# Patient Record
Sex: FEMALE | Race: AMERICAN INDIAN OR ALASKA NATIVE
[De-identification: names, ages, dates, MRNs, and addresses within clinical notes are randomized per-mention and may not be internally consistent; named-entity substitution may affect disease eponyms.]

---

## 2019-01-03 NOTE — C.PDOC
History Of Present Illness


53 year old female with PMHx of DM and HTN presents to the ED c/o slightly 

elevated blood pressure, currently asymptomatic. Patient was seen at Dr. Yeh's office today and was given Lozartan 100 mg. Patient states she does 

not want to stay for workup because she wants to catch the bus. Patient denies 

headache, visual changes, CP, SOB, palpitations, weakness, numbness.


Time Seen by Provider: 01/03/19 19:24


Chief Complaint (Nursing): High Blood Pressure


History Per: Patient


History/Exam Limitations: no limitations


Onset/Duration Of Symptoms: Hrs


Current Symptoms Are (Timing): Gone


Quality Of Symptoms: Asymptomatic


Exacerbating Factor(s): Pos: Recent Change In Medication


Recent travel outside of the [place]: No


Additional History Per: Patient





Past Medical History


Reviewed: Historical Data, Nursing Documentation, Vital Signs


Vital Signs: 





                                Last Vital Signs











Temp  98.4 F   01/03/19 18:40


 


Pulse  78   01/03/19 18:40


 


Resp  18   01/03/19 18:40


 


BP  160/86 H  01/03/19 18:40


 


Pulse Ox  99   01/03/19 18:40














- Medical History


PMH: Diabetes, HTN


Surgical History: No Surg Hx


Family History: States: Unknown Family Hx





- Social History


Hx Tobacco Use: Yes


Hx Alcohol Use: Yes


Hx Substance Use: No





- Immunization History


Hx Tetanus Toxoid Vaccination: Yes


Hx Influenza Vaccination: Yes


Hx Pneumococcal Vaccination: Yes





Review Of Systems


Constitutional: Negative for: Fever, Chills


Eyes: Negative for: Vision Change


Cardiovascular: Negative for: Chest Pain, Palpitations


Respiratory: Negative for: Cough, Shortness of Breath


Gastrointestinal: Negative for: Nausea, Vomiting, Abdominal Pain


Skin: Negative for: Rash


Neurological: Negative for: Weakness, Numbness, Headache, Dizziness





Physical Exam





- Physical Exam


Appears: Non-toxic, No Acute Distress, Other (obese black female )


Skin: Normal Color, Warm, Dry


Head: Atraumatic, Normacephalic


Eye(s): bilateral: Normal Inspection, PERRL, EOMI


Neck: Normal ROM, Supple


Chest: Symmetrical


Cardiovascular: Rhythm Regular


Respiratory: Normal Breath Sounds, No Rales, No Rhonchi, No Wheezing


Gastrointestinal/Abdominal: Soft, No Tenderness, No Guarding, No Rebound


Extremity: Normal ROM, No Tenderness, No Swelling


Neurological/Psych: Oriented x3, Normal Speech, Normal Cognition


Gait: Steady





ED Course And Treatment


O2 Sat by Pulse Oximetry: 99 (ON RA)


Pulse Ox Interpretation: Normal





Medical Decision Making


Medical Decision Making: 





seen by PMD earlier today, started on Losartan by Dr. Yeh


feels fine


defers w/u for opt f/u with PMD, "needs to catch her bus."


BP wnl in ED





Disposition


Doctor Will See Patient In The: Office


Counseled Patient/Family Regarding: Studies Performed, Diagnosis





- Disposition


Referrals: 


Venkat Yeh MD [Family Provider] - 


Disposition: HOME/ ROUTINE


Disposition Time: 19:29


Condition: GOOD


Additional Instructions: 


continue daily Losartan per Dr. Yeh





Daily exercise- power walk 45 min/day x 5 days/week


weight loss





outpatient follow-up


Instructions:  High Blood Pressure in Adults


Forms:  [employer] [facility] (English)





- Clinical Impression


Clinical Impression: 


 Hypertension








- Scribe Statement


The provider has reviewed the documentation as recorded by the Scribe


Mahad Barron





All medical record entries made by the Scribe were at my direction and 

personally dictated by me. I have reviewed the chart and agree that the record 

accurately reflects my personal performance of the history, physical exam, 

medical decision making, and the department course for this patient. I have also

personally directed, reviewed, and agree with the discharge instructions and 

disposition.

## 2019-01-06 NOTE — CARD
--------------- APPROVED REPORT --------------





Date of service: 01/03/2019



EKG Measurement

Heart [id number]

[id number]

[id number]

[id number]

[id number]



<Conclusion>

Normal sinus rhythm

Rightward axis

Possible Inferior infarct, age undetermined

Abnormal ECG

## 2019-02-13 ENCOUNTER — HOSPITAL ENCOUNTER (OUTPATIENT)
Dept: HOSPITAL 31 - C.CTH | Age: 54
End: 2019-02-13
Payer: MEDICARE

## 2019-02-13 DIAGNOSIS — R59.9: Primary | ICD-10-CM

## 2019-04-13 ENCOUNTER — HOSPITAL ENCOUNTER (OUTPATIENT)
Dept: HOSPITAL 31 - C.ER | Age: 54
Setting detail: OBSERVATION
LOS: 1 days | Discharge: HOME | End: 2019-04-14
Attending: INTERNAL MEDICINE | Admitting: INTERNAL MEDICINE
Payer: MEDICARE

## 2019-04-13 DIAGNOSIS — B20: ICD-10-CM

## 2019-04-13 DIAGNOSIS — R07.89: Primary | ICD-10-CM

## 2019-04-13 DIAGNOSIS — F17.210: ICD-10-CM

## 2019-04-13 DIAGNOSIS — D64.9: ICD-10-CM

## 2019-04-13 DIAGNOSIS — E11.9: ICD-10-CM

## 2019-04-13 DIAGNOSIS — I10: ICD-10-CM

## 2019-04-13 DIAGNOSIS — F41.9: ICD-10-CM

## 2019-04-13 LAB
ALBUMIN SERPL-MCNC: 3.7 G/DL (ref 3.5–5)
ALBUMIN/GLOB SERPL: 1 {RATIO} (ref 1–2.1)
ALT SERPL-CCNC: 13 U/L (ref 9–52)
AST SERPL-CCNC: 26 U/L (ref 14–36)
BASOPHILS # BLD AUTO: 0 K/UL (ref 0–0.2)
BASOPHILS NFR BLD: 0.3 % (ref 0–2)
BUN SERPL-MCNC: 22 MG/DL (ref 7–17)
CALCIUM SERPL-MCNC: 9.7 MG/DL (ref 8.6–10.4)
CK MB SERPL-MCNC: 2.54 NG/ML (ref 0–3.38)
CK MB SERPL-MCNC: 2.79 NG/ML (ref 0–3.38)
EOSINOPHIL # BLD AUTO: 0.1 K/UL (ref 0–0.7)
EOSINOPHIL NFR BLD: 1.2 % (ref 0–4)
ERYTHROCYTE [DISTWIDTH] IN BLOOD BY AUTOMATED COUNT: 13.7 % (ref 11.5–14.5)
GFR NON-AFRICAN AMERICAN: 39
HGB BLD-MCNC: 10.8 G/DL (ref 11–16)
LIPASE: 32 U/L (ref 23–300)
LYMPHOCYTES # BLD AUTO: 2.8 K/UL (ref 1–4.3)
LYMPHOCYTES NFR BLD AUTO: 42.1 % (ref 20–40)
MCH RBC QN AUTO: 30.7 PG (ref 27–31)
MCHC RBC AUTO-ENTMCNC: 32.8 G/DL (ref 33–37)
MCV RBC AUTO: 93.7 FL (ref 81–99)
MONOCYTES # BLD: 0.7 K/UL (ref 0–0.8)
MONOCYTES NFR BLD: 10.1 % (ref 0–10)
NEUTROPHILS # BLD: 3.1 K/UL (ref 1.8–7)
NEUTROPHILS NFR BLD AUTO: 46.3 % (ref 50–75)
NRBC BLD AUTO-RTO: 0.1 % (ref 0–2)
PLATELET # BLD: 210 K/UL (ref 130–400)
PMV BLD AUTO: 9.3 FL (ref 7.2–11.7)
RBC # BLD AUTO: 3.51 MIL/UL (ref 3.8–5.2)
WBC # BLD AUTO: 6.7 K/UL (ref 4.8–10.8)

## 2019-04-13 PROCEDURE — 71045 X-RAY EXAM CHEST 1 VIEW: CPT

## 2019-04-13 PROCEDURE — 85025 COMPLETE CBC W/AUTO DIFF WBC: CPT

## 2019-04-13 PROCEDURE — 83690 ASSAY OF LIPASE: CPT

## 2019-04-13 PROCEDURE — 80053 COMPREHEN METABOLIC PANEL: CPT

## 2019-04-13 PROCEDURE — 36415 COLL VENOUS BLD VENIPUNCTURE: CPT

## 2019-04-13 PROCEDURE — 99284 EMERGENCY DEPT VISIT MOD MDM: CPT

## 2019-04-13 PROCEDURE — 84484 ASSAY OF TROPONIN QUANT: CPT

## 2019-04-13 PROCEDURE — 84703 CHORIONIC GONADOTROPIN ASSAY: CPT

## 2019-04-13 RX ADMIN — ENOXAPARIN SODIUM SCH: 40 INJECTION SUBCUTANEOUS at 10:21

## 2019-04-13 NOTE — CP.PCM.PN
Subjective





- Date & Time of Evaluation


Date of Evaluation: 04/13/19


Time of Evaluation: 17:15





- Subjective


Subjective: 





alert, orientedx3, no sob or chest pains, abdomen soft no tenderness, NAD.





Objective





- Vital Signs/Intake and Output


Vital Signs (last 24 hours): 


                                        











Temp Pulse Resp BP Pulse Ox


 


 98.6 F   78   20   143/78   98 


 


 04/13/19 07:00  04/13/19 15:20  04/13/19 07:00  04/13/19 07:00  04/13/19 11:59








Intake and Output: 


                                        











 04/13/19 04/13/19





 06:59 18:59


 


Intake Total 240 


 


Balance 240 














- Medications


Medications: 


                               Current Medications





Al Hydrox/Mg Hydrox/Simethicone (Maalox 30 Ml)  30 ml PO Q8H PRN


   PRN Reason: Indigestion / Heartburn


Enoxaparin Sodium (Lovenox)  40 mg SC DAILY [facility]


   Last Admin: 04/13/19 10:21 Dose:  Not Given


Hydrochlorothiazide (Microzide)  12.5 mg PO DAILY [facility]


   Last Admin: 04/13/19 10:20 Dose:  12.5 mg


Losartan Potassium (Cozaar)  100 mg PO DAILY [facility]


   Last Admin: 04/13/19 10:20 Dose:  100 mg


Rosuvastatin Calcium (Crestor)  5 mg PO HS [facility]











- Labs


Labs: 


                                        





                                 04/13/19 01:54 





                                 04/13/19 01:54 











Assessment and Plan





- Assessment and Plan (Free Text)


Assessment: 





53 year old female admitted with chest pain, seen and examined. Alert and 

orientedx3, ELIAS negative, denies sob or chest pains. Complaints of some 

epigastric distress, improved with maalox. Discussed with DR Yeh, plan to 

discharge home in am. Advised to follow up with PMD in 1 week.

## 2019-04-13 NOTE — RAD
Chest x-ray single frontal view 



History: Chest pain. 



Comparison: 01/27/2016 



Findings: 



Mild venous congestion. 



Patchy increased markings at the right lung base.  Right hilar 

prominence. 



Mild linear atelectasis at the left lung base. 



Enlarged ectatic aorta. 



Mild cardiomegaly. 



Degenerative changes in the spine. 



Impression: 



Mild venous congestion. 



Patchy increased markings at the right lung base.  Right hilar 

prominence. 



Mild linear atelectasis at the left lung base. 



Enlarged ectatic aorta. 



Mild cardiomegaly.

## 2019-04-13 NOTE — CP.PCM.HP
Present on Admission





- Present on Admission


Any Indicators Present on Admission: No





Past Patient History





- Infectious Disease


Hx of Infectious Diseases: None





- Past Medical History & Family History


Past Medical History?: Yes





- Past Social History


Smoking Status: Heavy Smoker > 10 Cigarettes Daily





- CARDIAC


Hx Hypertension: Yes





- ENDOCRINE/METABOLIC


Hx Endocrine Disorders: Yes


Other/Comment: "pre-diabetic"





- MUSCULOSKELETAL/RHEUMATOLOGICAL


Hx Falls: No





- PSYCHIATRIC


Hx Substance Use: No





- SURGICAL HISTORY


Hx Surgeries: No





- ANESTHESIA


Hx Anesthesia: No


Hx Anesthesia Reactions: No


Hx Malignant Hyperthermia: No





Meds


Allergies/Adverse Reactions: 


                                    Allergies











Allergy/AdvReac Type Severity Reaction Status Date / Time


 


No Known Allergies Allergy   Verified 01/03/19 18:44














Results





- Vital Signs


Recent Vital Signs: 





                                Last Vital Signs











Temp  98.7 F   04/13/19 16:00


 


Pulse  72   04/13/19 16:00


 


Resp  18   04/13/19 16:00


 


BP  138/67   04/13/19 16:00


 


Pulse Ox  98   04/13/19 16:00














- Labs


Result Diagrams: 


                                 04/13/19 01:54





                                 04/13/19 01:54


Labs: 





                         Laboratory Results - last 24 hr











  04/13/19 04/13/19 04/13/19





  01:21 01:40 01:54


 


WBC    6.7


 


RBC    3.51 L


 


Hgb    10.8 L


 


Hct    32.9 L


 


MCV    93.7


 


MCH    30.7


 


MCHC    32.8 L


 


RDW    13.7


 


Plt Count    210


 


MPV    9.3


 


Neut % (Auto)    46.3 L


 


Lymph % (Auto)    42.1 H


 


Mono % (Auto)    10.1 H


 


Eos % (Auto)    1.2


 


Baso % (Auto)    0.3


 


Neut # (Auto)    3.1


 


Lymph # (Auto)    2.8


 


Mono # (Auto)    0.7


 


Eos # (Auto)    0.1


 


Baso # (Auto)    0.0


 


Sodium   


 


Potassium   


 


Chloride   


 


Carbon Dioxide   


 


Anion Gap   


 


BUN   


 


Creatinine   


 


Est GFR ( Amer)   


 


Est GFR (Non-Af Amer)   


 


Random Glucose   


 


Calcium   


 


Total Bilirubin   


 


AST   


 


ALT   


 


Alkaline Phosphatase   


 


Total Creatine Kinase   


 


CK-MB (Mass)   


 


Troponin I   


 


Total Protein   


 


Albumin   


 


Globulin   


 


Albumin/Globulin Ratio   


 


Lipase   


 


Urine HCG, Qual   Negative 


 


Urine Opiates Screen  Negative  


 


Urine Methadone Screen  Negative  


 


Ur Barbiturates Screen  Negative  


 


Ur Phencyclidine Scrn  Negative  


 


Ur Amphetamines Screen  Negative  


 


U Benzodiazepines Scrn  Negative  


 


U Oth Cocaine Metabols  Negative  


 


U Cannabinoids Screen  Negative  














  04/13/19 04/13/19 04/13/19





  01:54 07:46 13:55


 


WBC   


 


RBC   


 


Hgb   


 


Hct   


 


MCV   


 


MCH   


 


MCHC   


 


RDW   


 


Plt Count   


 


MPV   


 


Neut % (Auto)   


 


Lymph % (Auto)   


 


Mono % (Auto)   


 


Eos % (Auto)   


 


Baso % (Auto)   


 


Neut # (Auto)   


 


Lymph # (Auto)   


 


Mono # (Auto)   


 


Eos # (Auto)   


 


Baso # (Auto)   


 


Sodium  136  


 


Potassium  3.6  


 


Chloride  102  


 


Carbon Dioxide  27  


 


Anion Gap  11  


 


BUN  22 H  


 


Creatinine  1.4 H  


 


Est GFR ( Amer)  48  


 


Est GFR (Non-Af Amer)  39  


 


Random Glucose  143 H D  


 


Calcium  9.7  


 


Total Bilirubin  0.3  


 


AST  26  


 


ALT  13  


 


Alkaline Phosphatase  79  


 


Total Creatine Kinase   146 H  117


 


CK-MB (Mass)   2.79  2.54


 


Troponin I  < 0.0120  < 0.0120  < 0.0120


 


Total Protein  7.3  


 


Albumin  3.7  


 


Globulin  3.6  


 


Albumin/Globulin Ratio  1.0  


 


Lipase  32  


 


Urine HCG, Qual   


 


Urine Opiates Screen   


 


Urine Methadone Screen   


 


Ur Barbiturates Screen   


 


Ur Phencyclidine Scrn   


 


Ur Amphetamines Screen   


 


U Benzodiazepines Scrn   


 


U Oth Cocaine Metabols   


 


U Cannabinoids Screen

## 2019-04-13 NOTE — C.PDOC
History Of Present Illness


Patient  presents to the ED c/o chest pain that started while at rest. Patient 

also reports she felt like her blood pressure was high, which prompted the visit

to the ED. Patient denies fever, chills, nausea, vomit, SOB, palpitations, 

headache, weakness, numbness.


Time Seen by Provider: 04/13/19 01:02


Chief Complaint (Nursing): Chest Pain


History Per: Patient


History/Exam Limitations: no limitations


Onset/Duration Of Symptoms: Hrs


Current Symptoms Are (Timing): Still Present


Quality: "Pain"


Recent travel outside of the [place]: No


Additional History Per: Patient





Past Medical History


Reviewed: Historical Data, Nursing Documentation, Vital Signs





- Medical History


PMH: Diabetes, HTN


Surgical History: No Surg Hx


Family History: States: Unknown Family Hx





- Social History


Hx Tobacco Use: Yes


Hx Alcohol Use: Yes


Hx Substance Use: No





- Immunization History


Hx Tetanus Toxoid Vaccination: Yes


Hx Influenza Vaccination: Yes


Hx Pneumococcal Vaccination: Yes





Review Of Systems


Constitutional: Negative for: Fever, Chills


Eyes: Negative for: Vision Change


Cardiovascular: Positive for: Chest Pain.  Negative for: Palpitations


Respiratory: Negative for: Shortness of Breath


Gastrointestinal: Negative for: Nausea, Vomiting, Abdominal Pain


Skin: Negative for: Rash


Neurological: Negative for: Weakness, Numbness, Headache, Dizziness





Physical Exam





- Physical Exam


Appears: Non-toxic, No Acute Distress


Skin: Warm, Dry


Head: Normacephalic


Eye(s): bilateral: Normal Inspection, PERRL, EOMI


Neck: Supple


Chest: Symmetrical


Cardiovascular: Rhythm Regular


Respiratory: No Rales, No Rhonchi, No Wheezing


Gastrointestinal/Abdominal: Soft, No Tenderness, No Guarding, No Rebound


Extremity: Bilateral: Atraumatic, Normal Color And Temperature, Normal ROM


Neurological/Psych: Oriented x3, Normal Speech, Normal Cognition


Gait: Steady





ED Course And Treatment





- Laboratory Results


Result Diagrams: 


                                 04/13/19 01:54





                                 04/13/19 01:54


ECG: Interpreted By Me, Viewed By Me


ECG Rhythm: Sinus Rhythm (63), Nonspecific Changes


O2 Sat by Pulse Oximetry: 97 (ON RA)


Pulse Ox Interpretation: Normal





- Radiology


CXR: Interpreted by Me, Viewed By Me


CXR Interpretation: Yes: Other (unchanged from 1/27/16).  No: Infiltrates, 

Fracture, Pnemothorax


Progress Note: Plan:  - LAbs.  - EKG.  - CXR.  - Aspiring 325 mg PO





Disposition


Discussed With : Venkat Yeh


Comment: accepted the pt on his service and took over the care at 3:17 AM


Doctor Will See Patient In The: Hospital


Counseled Patient/Family Regarding: Studies Performed, Diagnosis





- Disposition


Disposition: HOSPITALIZED


Disposition Time: 01:02


Condition: FAIR


Forms:  [facility] (English)





- POA


Present On Arrival: Poor Glycemic Control





- Clinical Impression


Clinical Impression: 


 Chest pain








- Scribe Statement


The provider has reviewed the documentation as recorded by the Scribe


Mahad Barron





All medical record entries made by the Scribe were at my direction and 

personally dictated by me. I have reviewed the chart and agree that the record 

accurately reflects my personal performance of the history, physical exam, medic

al decision making, and the department course for this patient. I have also 

personally directed, reviewed, and agree with the discharge instructions and 

disposition.





Decision To Admit





- Pt Status Changed To:


Hospital Disposition Of: Observation





- .


Bed Request Type: Telemetry


Patient Diagnosis: 


 Chest pain

## 2019-04-14 VITALS — RESPIRATION RATE: 18 BRPM | HEART RATE: 65 BPM

## 2019-04-14 VITALS — TEMPERATURE: 98.3 F | OXYGEN SATURATION: 99 % | SYSTOLIC BLOOD PRESSURE: 136 MMHG | DIASTOLIC BLOOD PRESSURE: 85 MMHG

## 2019-04-14 RX ADMIN — ENOXAPARIN SODIUM SCH: 40 INJECTION SUBCUTANEOUS at 09:40

## 2019-04-14 NOTE — CP.PCM.DIS
Provider





- Provider


Date of Admission: 


04/13/19 03:09





Attending physician: 


Venkat Yeh MD





Time Spent in preparation of Discharge (in minutes): 30





Hospital Course





- Lab Results


Lab Results: 


                             Most Recent Lab Values











WBC  6.7 K/uL (4.8-10.8)   04/13/19  01:54    


 


RBC  3.51 Mil/uL (3.80-5.20)  L  04/13/19  01:54    


 


Hgb  10.8 g/dL (11.0-16.0)  L  04/13/19  01:54    


 


Hct  32.9 % (34.0-47.0)  L  04/13/19  01:54    


 


MCV  93.7 fL (81.0-99.0)   04/13/19  01:54    


 


MCH  30.7 pg (27.0-31.0)   04/13/19  01:54    


 


MCHC  32.8 g/dL (33.0-37.0)  L  04/13/19  01:54    


 


RDW  13.7 % (11.5-14.5)   04/13/19  01:54    


 


Plt Count  210 K/uL (130-400)   04/13/19  01:54    


 


MPV  9.3 fL (7.2-11.7)   04/13/19  01:54    


 


Neut % (Auto)  46.3 % (50.0-75.0)  L  04/13/19  01:54    


 


Lymph % (Auto)  42.1 % (20.0-40.0)  H  04/13/19  01:54    


 


Mono % (Auto)  10.1 % (0.0-10.0)  H  04/13/19  01:54    


 


Eos % (Auto)  1.2 % (0.0-4.0)   04/13/19  01:54    


 


Baso % (Auto)  0.3 % (0.0-2.0)   04/13/19  01:54    


 


Neut # (Auto)  3.1 K/uL (1.8-7.0)   04/13/19  01:54    


 


Lymph # (Auto)  2.8 K/uL (1.0-4.3)   04/13/19  01:54    


 


Mono # (Auto)  0.7 K/uL (0.0-0.8)   04/13/19  01:54    


 


Eos # (Auto)  0.1 K/uL (0.0-0.7)   04/13/19  01:54    


 


Baso # (Auto)  0.0 K/uL (0.0-0.2)   04/13/19  01:54    


 


Sodium  136 mmol/L (132-148)   04/13/19  01:54    


 


Potassium  3.6 mmol/L (3.6-5.2)   04/13/19  01:54    


 


Chloride  102 mmol/L ()   04/13/19  01:54    


 


Carbon Dioxide  27 mmol/L (22-30)   04/13/19  01:54    


 


Anion Gap  11  (10-20)   04/13/19  01:54    


 


BUN  22 mg/dL (7-17)  H  04/13/19  01:54    


 


Creatinine  1.4 mg/dL (0.7-1.2)  H  04/13/19  01:54    


 


Est GFR ( Amer)  48   04/13/19  01:54    


 


Est GFR (Non-Af Amer)  39   04/13/19  01:54    


 


Random Glucose  143 mg/dL ()  H D 04/13/19  01:54    


 


Calcium  9.7 mg/dl (8.6-10.4)   04/13/19  01:54    


 


Total Bilirubin  0.3 mg/dL (0.2-1.3)   04/13/19  01:54    


 


AST  26 U/L (14-36)   04/13/19  01:54    


 


ALT  13 U/L (9-52)   04/13/19  01:54    


 


Alkaline Phosphatase  79 U/L ()   04/13/19  01:54    


 


Total Creatine Kinase  117 U/L ()   04/13/19  13:55    


 


CK-MB (Mass)  2.54 ng/mL (0.0-3.38)   04/13/19  13:55    


 


Troponin I  < 0.0120 ng/mL (0.00-0.120)   04/13/19  13:55    


 


Total Protein  7.3 g/dL (6.3-8.3)   04/13/19  01:54    


 


Albumin  3.7 g/dL (3.5-5.0)   04/13/19  01:54    


 


Globulin  3.6 gm/dL (2.2-3.9)   04/13/19  01:54    


 


Albumin/Globulin Ratio  1.0  (1.0-2.1)   04/13/19  01:54    


 


Lipase  32 U/L ()   04/13/19  01:54    


 


Urine HCG, Qual  Negative  (NEGATIVE)   04/13/19  01:40    


 


Urine Opiates Screen  Negative  (NEGATIVE)   04/13/19  01:21    


 


Urine Methadone Screen  Negative  (NEGATIVE)   04/13/19  01:21    


 


Ur Barbiturates Screen  Negative  (NEGATIVE)   04/13/19  01:21    


 


Ur Phencyclidine Scrn  Negative  (NEGATIVE)   04/13/19  01:21    


 


Ur Amphetamines Screen  Negative  (NEGATIVE)   04/13/19  01:21    


 


U Benzodiazepines Scrn  Negative  (NEGATIVE)   04/13/19  01:21    


 


U Oth Cocaine Metabols  Negative  (NEGATIVE)   04/13/19  01:21    


 


U Cannabinoids Screen  Negative  (NEGATIVE)   04/13/19  01:21    














Discharge Plan





- Follow Up Plan


Condition: FAIR


Disposition: HOME/ ROUTINE


Instructions:  Chest Pain (DC)


Additional Instructions: 


AS TOLERATED


Referrals: 


Venkat Yeh MD [Staff Provider] -

## 2019-04-14 NOTE — HP
CHIEF COMPLAINT:  Chest pain xfew hours.



HISTORY OF PRESENT ILLNESS:  This is a 53-year-old  female,

well known to me with history of hypertension, nonsmoker, _____

non-diabetic with normal lipid levels.  She is HIV positive, on HAART

therapy.  She is compliant with her diet, medications, and followup.  She

has history of multiple hospitalizations with chest pain, although in the

past her workup for chest pain, for coronary artery disease is negative. 

On the day of admission, she developed sudden substernal chest pain, dull,

non-radiating, not associated with diabetes or dizziness.  No cough, no

dyspepsia.  This chest pain is non-exertional.  No history of pleurisy. 

There is no history of dyspepsia.  There is no history of nausea or

vomiting.  There is no history of dizziness, diaphoresis or dyspnea,

occasional dyspnea on exertion.  There is no history of orthopnea or PND. 

There is no history of abdominal pain, nausea, vomiting, or diarrhea. 

There is no history of rectal bleed.  There is no history of joint pain,

hip pain, or leg pains.



ALLERGIES:  ON HER ACCOUNT, NO KNOWN ALLERGIES.



CURRENT MEDICATIONS:  She is on valsartan.



SOCIAL HISTORY:  She is nonsmoker, non-ETOH user.



PHYSICAL EXAMINATION:

GENERAL:  A middle-aged female in no acute distress.  At the moment, she

denies chest pain.

VITAL SIGNS:  Blood pressure 138/67, pulse 72, respiratory rate 18,

temperature 98.7.

SKIN:  No rash seen, no bruising, no purpura, no petechia, no ecchymosis.

HEENT:  Atraumatic, normocephalic.  Negative pallor.  Negative jaundice. 

Extraocular movements are intact.

NECK:  Supple.  No JVD, no lymph node, no thyromegaly, no carotid bruits.

CHEST:  Chest wall bilateral symmetrical expansion.  No tenderness.  No

deformity.

LUNGS:  Bilaterally clear.  No rales.  No rhonchi.

CARDIOVASCULAR SYSTEM:  PMI at the fifth intercostal space.  S1, S2,

regular.  No heave, no thrill.

ABDOMEN:  Soft, nontender.  Bowel sounds are positive.

RECTAL:  Negative.

EXTREMITIES:  No clubbing, cyanosis, or edema.

CENTRAL NERVOUS SYSTEM:  Awake, alert, oriented x3.



ASSESSMENT:

1.  Atypical chest pain, rule out myocardial infarction.

2.  Hypertension.

3.  Anemia.

4.  Acquired immunodeficiency syndrome.



PLAN:  Continue BP medications, Cardizem, _____.  Monitor the patient.







__________________________________________

Venkat Yeh MD





DD:  04/13/2019 22:02:31

DT:  04/14/2019 0:10:57

Job # [id number]

## 2019-04-15 NOTE — DS
DISCHARGE DIAGNOSES:

1.  Noncoronary chest pain.

2.  Hypertension.

3.  Anxiety, rule out cardiac neurosis.

4.  Acquired human immunodeficiency syndrome.



HOSPITAL COURSE:  This is a 53-year-old  female with

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

contact, hypertension who came in because of chest pain, MI was rule out, 

_____ negative cardiac enzyme given her negative workup in the past.  She

is not a candidate for repeat cardiac stress testing or angiogram, and she

is for discharge.



CONDITION UPON DISCHARGE:  Stable.





__________________________________________

Venkat Yeh MD





DD:  04/14/2019 22:53:49

DT:  04/15/2019 1:30:46

Job # [id number]

## 2019-04-16 NOTE — CARD
--------------- APPROVED REPORT --------------





Date of service: 04/13/2019



EKG Measurement

Heart [id number]

TX [id number]

[id number]

[id number]

[id number]



<Conclusion>

Normal sinus rhythm

Rightward axis

Borderline ECG

## 2019-04-23 ENCOUNTER — HOSPITAL ENCOUNTER (EMERGENCY)
Dept: HOSPITAL 31 - C.ER | Age: 54
Discharge: HOME | End: 2019-04-23
Payer: MEDICARE

## 2019-04-23 VITALS — HEART RATE: 74 BPM | TEMPERATURE: 97.5 F | DIASTOLIC BLOOD PRESSURE: 78 MMHG | SYSTOLIC BLOOD PRESSURE: 134 MMHG

## 2019-04-23 VITALS — OXYGEN SATURATION: 97 %

## 2019-04-23 VITALS — RESPIRATION RATE: 14 BRPM

## 2019-04-23 DIAGNOSIS — R07.9: ICD-10-CM

## 2019-04-23 DIAGNOSIS — F41.9: Primary | ICD-10-CM

## 2019-04-23 LAB
ALBUMIN SERPL-MCNC: 3.8 G/DL (ref 3.5–5)
ALBUMIN/GLOB SERPL: 1 {RATIO} (ref 1–2.1)
ALT SERPL-CCNC: 13 U/L (ref 9–52)
APTT BLD: 32 SECONDS (ref 21–34)
AST SERPL-CCNC: 26 U/L (ref 14–36)
BASOPHILS # BLD AUTO: 0 K/UL (ref 0–0.2)
BASOPHILS NFR BLD: 0.5 % (ref 0–2)
BNP SERPL-MCNC: 51 PG/ML (ref 0–900)
BUN SERPL-MCNC: 20 MG/DL (ref 7–17)
CALCIUM SERPL-MCNC: 9.6 MG/DL (ref 8.6–10.4)
EOSINOPHIL # BLD AUTO: 0.1 K/UL (ref 0–0.7)
EOSINOPHIL NFR BLD: 1.4 % (ref 0–4)
ERYTHROCYTE [DISTWIDTH] IN BLOOD BY AUTOMATED COUNT: 14 % (ref 11.5–14.5)
GFR NON-AFRICAN AMERICAN: 43
HGB BLD-MCNC: 11 G/DL (ref 11–16)
INR PPP: 1.1
LIPASE: 25 U/L (ref 23–300)
LYMPHOCYTES # BLD AUTO: 2.9 K/UL (ref 1–4.3)
LYMPHOCYTES NFR BLD AUTO: 46.3 % (ref 20–40)
MCH RBC QN AUTO: 30.7 PG (ref 27–31)
MCHC RBC AUTO-ENTMCNC: 32.9 G/DL (ref 33–37)
MCV RBC AUTO: 93.3 FL (ref 81–99)
MONOCYTES # BLD: 0.5 K/UL (ref 0–0.8)
MONOCYTES NFR BLD: 8.6 % (ref 0–10)
NEUTROPHILS # BLD: 2.7 K/UL (ref 1.8–7)
NEUTROPHILS NFR BLD AUTO: 43.2 % (ref 50–75)
NRBC BLD AUTO-RTO: 0 % (ref 0–2)
PLATELET # BLD: 213 K/UL (ref 130–400)
PMV BLD AUTO: 9.7 FL (ref 7.2–11.7)
PROTHROMBIN TIME: 12 SECONDS (ref 9.7–12.2)
RBC # BLD AUTO: 3.59 MIL/UL (ref 3.8–5.2)
WBC # BLD AUTO: 6.2 K/UL (ref 4.8–10.8)

## 2019-04-23 NOTE — C.PDOC
History Of Present Illness





Patient was trying to go to sleep and felt some chest discomfort, which promted 

the ed visit. Pt was seen here last week for similar complaints and had a 

negative cardiac work up.  Patient still smoke 1 ppd. Speaking in complete sen

tences. No f/c/n/v


Chief Complaint (Nursing): Chest Pain


History Per: Patient


History/Exam Limitations: no limitations


Onset/Duration Of Symptoms: Hrs


Current Symptoms Are (Timing): Better


Severity: Mild


Pain Scale Rating Of: 3


Reports Recently: Seen In ED, Treated By A Physician, Hospitalized


Recent travel outside of the [place]: No


Additional History Per: Patient





Past Medical History


Reviewed: Historical Data, Nursing Documentation, Vital Signs





- Medical History


PMH: Diabetes, HTN


Family History: States: No Known Family Hx





- Social History


Hx Tobacco Use: Yes


Hx Alcohol Use: Yes


Hx Substance Use: No





- Immunization History


Hx Tetanus Toxoid Vaccination: Yes


Hx Influenza Vaccination: Yes


Hx Pneumococcal Vaccination: Yes





Review Of Systems


Constitutional: Negative for: Fever, Chills


Eyes: Negative for: Vision Change


ENT: Negative for: Throat Pain


Cardiovascular: Positive for: Chest Pain


Respiratory: Negative for: Shortness of Breath


Gastrointestinal: Negative for: Nausea, Vomiting, Abdominal Pain


Genitourinary: Negative for: Dysuria


Musculoskeletal: Negative for: Back Pain


Skin: Negative for: Rash


Neurological: Negative for: Weakness


Psych: Positive for: Anxiety





Physical Exam





- Physical Exam


Appears: Non-toxic, No Acute Distress


Skin: Warm, Dry


Head: Normacephalic


Eye(s): bilateral: Normal Inspection


Oral Mucosa: Moist


Neck: Supple


Chest: Symmetrical


Cardiovascular: Rhythm Regular


Respiratory: No Rales, No Rhonchi, No Wheezing


Gastrointestinal/Abdominal: Soft, No Tenderness, No Distention


Back: No CVA Tenderness


Extremity: No Tenderness


Extremity: Bilateral: Atraumatic


Pulses: Left Dorsalis Pedis: Normal, Right Dorsalis Pedis: Normal


Neurological/Psych: Oriented x3


Gait: Steady





ED Course And Treatment





- Laboratory Results


Result Diagrams: 


                                 04/23/19 04:31





                                 04/23/19 04:31


ECG: Interpreted By Me, Viewed By Me


ECG Rhythm: Sinus Rhythm (64), Nonspecific Changes


O2 Sat by Pulse Oximetry: 97


Pulse Ox Interpretation: Normal





- Radiology


CXR: Interpreted by Me, Viewed By Me


CXR Interpretation: Yes: Cardiomegaly (mild), Other (mild vasc congestion, 

unchanged from 4/13/19).  No: Infiltrates, Fracture, Pnemothorax


Reevaluation Time: 05:46


Reassessment Condition: Improved





Medical Decision Making


Medical Decision Making: 





I considered the following diagnoses: acute coronary syndrome, pulmonary 

embolism, lower respiratory infection, aortic dissection/aneurysm, pneumothorax,

pericarditis, esophagitis/GERD, zoster and esophageal rupture but found them to 

be unlikely based on the history, physical exam, and diagnostics. My conclusions

regarding the unlikely diagnoses were based on: the absence of significant EKG 

abnormalities, the lack of suggestive x-ray findings, the absence of significant

abnormalities on cardiac monitoring, the absence of asymmetric pulses. Pt is cp 

free and wants to go home








Upon provider reevaluation patient is feeling better, is medically stable, and 

requires no further treatment in the ED at this time. Patient will be discharged

home . Counseling was provided and all questions were answered regarding 

diagnosis and need for follow up with dr yeh. There is agreement to 

discharge plan. Return if symptoms persist or worsen.





Disposition


Counseled Patient/Family Regarding: Studies Performed, Diagnosis





- Disposition


Referrals: 


Venkat Yeh MD [Staff Provider] - 


Disposition: HOME/ ROUTINE


Disposition Time: 03:49


Condition: FAIR


Additional Instructions: 


Please return if symptoms recur


Instructions:  Chest Pain (DC), Anxiety, Adult (DC)


Forms:  [facility] (English)





- Clinical Impression


Clinical Impression: 


 Chest pain, Anxiety

## 2019-04-23 NOTE — C.PDOC
Chief Complaint (Nursing): Chest Pain





Past Medical History





- Medical History


PMH: Diabetes, HTN


Family History: States: Unknown Family Hx





- Social History


Hx Tobacco Use: Yes


Hx Alcohol Use: Yes


Hx Substance Use: No





- Immunization History


Hx Tetanus Toxoid Vaccination: Yes


Hx Influenza Vaccination: Yes


Hx Pneumococcal Vaccination: Yes





Disposition


Counseled Patient/Family Regarding: Studies Performed, Diagnosis





- Disposition


Disposition Time: 03:49

## 2019-04-23 NOTE — RAD
HISTORY:

 chest pain 



COMPARISON:

Chest x-ray performed 4/13/19 



TECHNIQUE:

Chest, one view.



FINDINGS:

Examination limited by habitus.



LUNGS:

Mild venous congestion.  Right hilar/infrahilar prominence and 

infiltrate or edema. 



Please note that chest x-ray has limited sensitivity for the 

detection of pulmonary masses.



PLEURA:

No significant pleural effusion identified. No definite pneumothorax .



CARDIOVASCULAR:

Heart size appears top normal.  Faint atherosclerotic calcifications 

of the aorta. 



OSSEOUS STRUCTURES:

Degenerative changes of the spine.



VISUALIZED UPPER ABDOMEN:

Unremarkable.



OTHER FINDINGS:

None.



IMPRESSION:

Mild venous congestion.  Right hilar/infrahilar prominence and 

infiltrate or edema.

## 2019-04-24 NOTE — CARD
--------------- APPROVED REPORT --------------





Date of service: 04/23/2019



EKG Measurement

Heart [id number]

AL [id number]

[id number]

[id number]

[id number]



<Conclusion>

Normal sinus rhythm

Normal ECG